# Patient Record
Sex: FEMALE | Race: WHITE | NOT HISPANIC OR LATINO | URBAN - METROPOLITAN AREA
[De-identification: names, ages, dates, MRNs, and addresses within clinical notes are randomized per-mention and may not be internally consistent; named-entity substitution may affect disease eponyms.]

---

## 2019-02-17 ENCOUNTER — OFFICE VISIT (OUTPATIENT)
Dept: URGENT CARE | Facility: PHYSICIAN GROUP | Age: 19
End: 2019-02-17
Payer: COMMERCIAL

## 2019-02-17 VITALS
TEMPERATURE: 97.8 F | HEIGHT: 58 IN | BODY MASS INDEX: 25.4 KG/M2 | OXYGEN SATURATION: 95 % | HEART RATE: 107 BPM | SYSTOLIC BLOOD PRESSURE: 100 MMHG | DIASTOLIC BLOOD PRESSURE: 60 MMHG | WEIGHT: 121 LBS

## 2019-02-17 DIAGNOSIS — L03.011 PARONYCHIA OF RIGHT INDEX FINGER: ICD-10-CM

## 2019-02-17 PROCEDURE — 10060 I&D ABSCESS SIMPLE/SINGLE: CPT | Performed by: EMERGENCY MEDICINE

## 2019-02-17 ASSESSMENT — ENCOUNTER SYMPTOMS
SENSORY CHANGE: 0
JOINT SWELLING: 0
FEVER: 0
NUMBNESS: 0
FOCAL WEAKNESS: 0

## 2019-02-17 ASSESSMENT — PAIN SCALES - GENERAL: PAINLEVEL: 6=MODERATE PAIN

## 2019-02-17 NOTE — PROGRESS NOTES
"Subjective:      Raquel Salmeron is a 18 y.o. female who presents with Digit Pain (right hand pointer finger pain, redness, swelling x 1 day)            Hand Injury   This is a new problem. Episode onset: 2 days. Pertinent negatives include no fever, joint swelling or numbness. Exacerbated by: pressure. She has tried nothing for the symptoms.   Denies trauma; notes increasing swelling, discoloration left index finger lateral nail fold region proximally.    Review of Systems   Constitutional: Negative for fever.   Musculoskeletal: Negative for joint swelling.        No pain proximal or distal to the site.   Neurological: Negative for sensory change, focal weakness and numbness.     PMH:  has no past medical history on file.  MEDS:   Current Outpatient Prescriptions:   •  Norgestimate-Eth Estradiol (MONONESSA PO), Take  by mouth., Disp: , Rfl:   ALLERGIES: No Known Allergies  SURGHX: No past surgical history on file.  SOCHX:  reports that she has never smoked. She has never used smokeless tobacco. She reports that she drinks alcohol. She reports that she does not use drugs.  FH: family history is not on file.       Objective:     /60   Pulse (!) 107   Temp 36.6 °C (97.8 °F) (Temporal)   Ht 1.473 m (4' 10\")   Wt 54.9 kg (121 lb)   SpO2 95%   BMI 25.29 kg/m²      Physical Exam   Constitutional: Vital signs are normal. She appears well-developed and well-nourished. She is cooperative. She does not have a sickly appearance. She does not appear ill. No distress.   Cardiovascular:   Distal capillary refill intact.   Lymphadenopathy:   No lymphangitis   Neurological: She is alert.   Distal sensation to light touch and pressure intact.   Skin: Skin is warm and dry. Lesion noted.        Psychiatric: She has a normal mood and affect.          Procedure: Incision and Drainage  -Risks, benefits, and alternatives discussed.  -Clean/sterile technique throughout  -Hibiclens skin prep  -Local anesthesia with ethyl chloride " spray  -Incision with #11 blade parallel to nail plate with purulent material expressed  -Minimal bleeding with good hemostasis achieved  -The patient tolerated the procedure well       Assessment/Plan:     1. Paronychia of right index finger  I & D  Wound care instructions.

## 2019-05-20 ENCOUNTER — OFFICE VISIT (OUTPATIENT)
Dept: URGENT CARE | Facility: PHYSICIAN GROUP | Age: 19
End: 2019-05-20
Payer: COMMERCIAL

## 2019-05-20 ENCOUNTER — HOSPITAL ENCOUNTER (OUTPATIENT)
Facility: MEDICAL CENTER | Age: 19
End: 2019-05-20
Attending: FAMILY MEDICINE
Payer: COMMERCIAL

## 2019-05-20 VITALS
HEIGHT: 58 IN | TEMPERATURE: 97.4 F | OXYGEN SATURATION: 97 % | WEIGHT: 121 LBS | SYSTOLIC BLOOD PRESSURE: 100 MMHG | HEART RATE: 97 BPM | DIASTOLIC BLOOD PRESSURE: 60 MMHG | BODY MASS INDEX: 25.4 KG/M2

## 2019-05-20 DIAGNOSIS — N89.8 VAGINAL DISCHARGE: ICD-10-CM

## 2019-05-20 DIAGNOSIS — N89.8 VAGINAL LESION: ICD-10-CM

## 2019-05-20 PROCEDURE — 87491 CHLMYD TRACH DNA AMP PROBE: CPT

## 2019-05-20 PROCEDURE — 87660 TRICHOMONAS VAGIN DIR PROBE: CPT

## 2019-05-20 PROCEDURE — 99214 OFFICE O/P EST MOD 30 MIN: CPT | Performed by: FAMILY MEDICINE

## 2019-05-20 PROCEDURE — 87529 HSV DNA AMP PROBE: CPT

## 2019-05-20 PROCEDURE — 87591 N.GONORRHOEAE DNA AMP PROB: CPT

## 2019-05-20 PROCEDURE — 87480 CANDIDA DNA DIR PROBE: CPT

## 2019-05-20 PROCEDURE — 87510 GARDNER VAG DNA DIR PROBE: CPT

## 2019-05-20 RX ORDER — VALACYCLOVIR HYDROCHLORIDE 1 G/1
1000 TABLET, FILM COATED ORAL 3 TIMES DAILY
Qty: 21 TAB | Refills: 0 | Status: SHIPPED | OUTPATIENT
Start: 2019-05-20 | End: 2019-05-27

## 2019-05-21 ENCOUNTER — HOSPITAL ENCOUNTER (OUTPATIENT)
Dept: LAB | Facility: MEDICAL CENTER | Age: 19
End: 2019-05-21
Attending: FAMILY MEDICINE
Payer: COMMERCIAL

## 2019-05-21 ENCOUNTER — TELEPHONE (OUTPATIENT)
Dept: URGENT CARE | Facility: PHYSICIAN GROUP | Age: 19
End: 2019-05-21

## 2019-05-21 DIAGNOSIS — B96.89 BV (BACTERIAL VAGINOSIS): ICD-10-CM

## 2019-05-21 DIAGNOSIS — N76.0 BV (BACTERIAL VAGINOSIS): ICD-10-CM

## 2019-05-21 DIAGNOSIS — N89.8 VAGINAL LESION: ICD-10-CM

## 2019-05-21 DIAGNOSIS — N89.8 VAGINAL DISCHARGE: ICD-10-CM

## 2019-05-21 LAB
CANDIDA DNA VAG QL PROBE+SIG AMP: NEGATIVE
G VAGINALIS DNA VAG QL PROBE+SIG AMP: POSITIVE
T VAGINALIS DNA VAG QL PROBE+SIG AMP: NEGATIVE
TREPONEMA PALLIDUM IGG+IGM AB [PRESENCE] IN SERUM OR PLASMA BY IMMUNOASSAY: NON REACTIVE

## 2019-05-21 PROCEDURE — 36415 COLL VENOUS BLD VENIPUNCTURE: CPT

## 2019-05-21 PROCEDURE — 86780 TREPONEMA PALLIDUM: CPT

## 2019-05-21 RX ORDER — METRONIDAZOLE 500 MG/1
500 TABLET ORAL EVERY 12 HOURS
Qty: 14 TAB | Refills: 0 | Status: SHIPPED | OUTPATIENT
Start: 2019-05-21 | End: 2019-05-28

## 2019-05-21 NOTE — PROGRESS NOTES
Subjective:      Chief Complaint   Patient presents with   • Exposure to STD     discharge, sores, anus hurts             Vaginal discharge  The patient's primary symptoms include yellow vaginal discharge. This is a new problem. The current episode started in the past 7 days. The problem occurs constantly. The problem has been gradually worsening. The pain is mild. . Pertinent negatives include no diarrhea, fever, flank pain, headaches or nausea. The symptoms are aggravated by intercourse. She has tried nothing for the symptoms. She is sexually active - she has multiple male partners. No, her partner does not have an STD. She uses condoms for contraception.      LMP - 2 wk ago        History reviewed. No pertinent past medical history.          Social History     Social History   • Marital status: Single     Spouse name: N/A   • Number of children: N/A   • Years of education: N/A     Social History Main Topics   • Smoking status: Never Smoker   • Smokeless tobacco: Never Used   • Alcohol use Yes   • Drug use: No   • Sexual activity: Yes     Partners: Female     Other Topics Concern   • Not on file     Social History Narrative   • No narrative on file       Current Outpatient Prescriptions on File Prior to Visit   Medication Sig Dispense Refill   • Norgestimate-Eth Estradiol (MONONESSA PO) Take  by mouth.       No current facility-administered medications on file prior to visit.          Family history was reviewed and not pertinent           Review of Systems   Constitutional: Negative for fever, chills and malaise/fatigue.   Eyes: Negative for vision changes, d/c.    Respiratory: Negative for cough and sputum production.    Cardiovascular: Negative for chest pain and palpitations.   Gastrointestinal: Negative for nausea, vomiting, abdominal pain, diarrhea and constipation.   Genitourinary: Positive for vaginal dicharge. Negative for flank pain.   Skin: Negative for rash or  itching.   Neurological: Negative for  "dizziness and tingling.   Psychiatric/Behavioral: Negative for depression.   Hematologic/lymphatic - denies bruising or excessive bleeding  All other systems reviewed and are negative.         Objective:     /60   Pulse 97   Temp 36.3 °C (97.4 °F) (Temporal)   Ht 1.473 m (4' 10\")   Wt 54.9 kg (121 lb)   SpO2 97%       Physical Exam   Constitutional: She is oriented to person, place, and time. She appears well-developed and well-nourished. No distress.   HENT:   Head: Normocephalic and atraumatic.   Eyes: Conjunctivae are normal.   Cardiovascular: Normal rate and regular rhythm.    Pulmonary/Chest: Effort normal and breath sounds normal.   Abdominal: Soft. She exhibits no distension. There is no tenderness.   Genitourinary: Pelvic exam was performed with patient supine. There are several small, grouped erythematous papules/ulcerations on the left labia.    Vaginal discharge found.   I was unable to insert speculum due to pt discomfort  Exam performed with chaperone present  Neurological: She is alert and oriented to person, place, and time.   Skin: Skin is warm. She is not diaphoretic. No erythema.   Psychiatric: Her behavior is normal.   Nursing note and vitals reviewed.              Assessment/Plan:          1. Vaginal discharge     - VAGINAL PATHOGENS DNA PANEL; Future  - HERPES VIRAL CULTURE  - Chlamydia/GC PCR Urine Or Swab; Future    2. Vaginal lesions   -likely herpes     - HERPES VIRAL CULTURE  - valacyclovir (VALTREX) 1 GM Tab; Take 1 Tab by mouth 3 times a day for 7 days.  Dispense: 21 Tab; Refill: 0    "

## 2019-05-22 LAB
C TRACH DNA SPEC QL NAA+PROBE: NEGATIVE
N GONORRHOEA DNA SPEC QL NAA+PROBE: NEGATIVE
SPECIMEN SOURCE: NORMAL

## 2019-05-23 ENCOUNTER — TELEPHONE (OUTPATIENT)
Dept: URGENT CARE | Facility: PHYSICIAN GROUP | Age: 19
End: 2019-05-23

## 2019-05-23 LAB
HSV1 DNA SPEC QL NAA+PROBE: NEGATIVE
HSV2 DNA SPEC QL NAA+PROBE: POSITIVE
SPECIMEN SOURCE: ABNORMAL

## 2019-05-26 ENCOUNTER — TELEPHONE (OUTPATIENT)
Dept: URGENT CARE | Facility: PHYSICIAN GROUP | Age: 19
End: 2019-05-26

## 2019-05-26 NOTE — TELEPHONE ENCOUNTER
----- Message from Chinedu Joshi M.D. sent at 5/25/2019  1:55 PM PDT -----  Vaginal lesions tested positive for herpes - type II (genital), as expected    I already started her on Valtrex - please advise her to f/u with gyn or PCP and advise no intercourse until symptoms have resolved